# Patient Record
Sex: FEMALE | Race: BLACK OR AFRICAN AMERICAN | NOT HISPANIC OR LATINO
[De-identification: names, ages, dates, MRNs, and addresses within clinical notes are randomized per-mention and may not be internally consistent; named-entity substitution may affect disease eponyms.]

---

## 2017-01-18 ENCOUNTER — LABORATORY RESULT (OUTPATIENT)
Age: 37
End: 2017-01-18

## 2017-01-19 ENCOUNTER — OUTPATIENT (OUTPATIENT)
Dept: OUTPATIENT SERVICES | Facility: HOSPITAL | Age: 37
LOS: 1 days | End: 2017-01-19
Payer: MEDICAID

## 2017-01-19 ENCOUNTER — APPOINTMENT (OUTPATIENT)
Dept: INFECTIOUS DISEASE | Facility: CLINIC | Age: 37
End: 2017-01-19

## 2017-01-19 VITALS
BODY MASS INDEX: 32.49 KG/M2 | TEMPERATURE: 98.6 F | WEIGHT: 207 LBS | DIASTOLIC BLOOD PRESSURE: 73 MMHG | HEIGHT: 67 IN | OXYGEN SATURATION: 100 % | HEART RATE: 81 BPM | SYSTOLIC BLOOD PRESSURE: 108 MMHG

## 2017-01-19 DIAGNOSIS — B20 HUMAN IMMUNODEFICIENCY VIRUS [HIV] DISEASE: ICD-10-CM

## 2017-01-19 PROCEDURE — 86780 TREPONEMA PALLIDUM: CPT

## 2017-01-19 PROCEDURE — 87340 HEPATITIS B SURFACE AG IA: CPT

## 2017-01-19 PROCEDURE — 86708 HEPATITIS A ANTIBODY: CPT

## 2017-01-19 PROCEDURE — 36415 COLL VENOUS BLD VENIPUNCTURE: CPT

## 2017-01-19 PROCEDURE — G0463: CPT

## 2017-01-19 PROCEDURE — 85730 THROMBOPLASTIN TIME PARTIAL: CPT

## 2017-01-19 PROCEDURE — 83036 HEMOGLOBIN GLYCOSYLATED A1C: CPT

## 2017-01-19 PROCEDURE — 82306 VITAMIN D 25 HYDROXY: CPT

## 2017-01-19 PROCEDURE — 87536 HIV-1 QUANT&REVRSE TRNSCRPJ: CPT

## 2017-01-19 PROCEDURE — 86803 HEPATITIS C AB TEST: CPT

## 2017-01-19 PROCEDURE — 85610 PROTHROMBIN TIME: CPT

## 2017-01-19 PROCEDURE — 86709 HEPATITIS A IGM ANTIBODY: CPT

## 2017-01-19 PROCEDURE — 86706 HEP B SURFACE ANTIBODY: CPT

## 2017-01-19 PROCEDURE — 86480 TB TEST CELL IMMUN MEASURE: CPT

## 2017-01-19 PROCEDURE — 86360 T CELL ABSOLUTE COUNT/RATIO: CPT

## 2017-01-20 LAB
24R-OH-CALCIDIOL SERPL-MCNC: 22.8 NG/ML — LOW (ref 30–100)
4/8 RATIO: 1.77 RATIO — SIGNIFICANT CHANGE UP (ref 0.9–3.6)
ABS CD8: 390 /UL — SIGNIFICANT CHANGE UP (ref 136–757)
APTT BLD: 30.7 SEC — SIGNIFICANT CHANGE UP (ref 27.5–37.4)
CD3 BLASTS SPEC-ACNC: 1087 /UL — SIGNIFICANT CHANGE UP (ref 799–2171)
CD3 BLASTS SPEC-ACNC: 73 % — SIGNIFICANT CHANGE UP (ref 59–85)
CD4 %: 47 % — SIGNIFICANT CHANGE UP (ref 36–65)
CD8 %: 26 % — SIGNIFICANT CHANGE UP (ref 11–36)
HAV IGG+IGM SER QL: REACTIVE
HAV IGM SER-ACNC: SIGNIFICANT CHANGE UP
HBV SURFACE AB SER-ACNC: SIGNIFICANT CHANGE UP
HBV SURFACE AG SER-ACNC: SIGNIFICANT CHANGE UP
HCV AB S/CO SERPL IA: 0.09 S/CO — SIGNIFICANT CHANGE UP
HCV AB SERPL-IMP: SIGNIFICANT CHANGE UP
INR BLD: 1.07 RATIO — SIGNIFICANT CHANGE UP (ref 0.88–1.16)
PROTHROM AB SERPL-ACNC: 12.1 SEC — SIGNIFICANT CHANGE UP (ref 10–13.1)
T PALLIDUM AB TITR SER: NEGATIVE — SIGNIFICANT CHANGE UP
T-CELL CD4 SUBSET PNL BLD: 692 /UL — SIGNIFICANT CHANGE UP (ref 489–1457)

## 2017-01-21 LAB
HBA1C BLD-MCNC: 5.4 % — SIGNIFICANT CHANGE UP (ref 4–5.6)
HIV1 RNA # SERPL NAA+PROBE: SIGNIFICANT CHANGE UP
HIV1 RNA SERPL NAA+PROBE-LOG#: SIGNIFICANT CHANGE UP LG10COP/ML
M TB TUBERC IFN-G BLD QL: -0.01 IU/ML — SIGNIFICANT CHANGE UP
M TB TUBERC IFN-G BLD QL: 0.05 IU/ML — SIGNIFICANT CHANGE UP
M TB TUBERC IFN-G BLD QL: NEGATIVE — SIGNIFICANT CHANGE UP
MITOGEN IGNF BCKGRD COR BLD-ACNC: >10 IU/ML — SIGNIFICANT CHANGE UP

## 2017-02-10 ENCOUNTER — APPOINTMENT (OUTPATIENT)
Dept: INFECTIOUS DISEASE | Facility: CLINIC | Age: 37
End: 2017-02-10

## 2017-07-05 ENCOUNTER — LABORATORY RESULT (OUTPATIENT)
Age: 37
End: 2017-07-05

## 2017-07-06 ENCOUNTER — APPOINTMENT (OUTPATIENT)
Dept: INFECTIOUS DISEASE | Facility: CLINIC | Age: 37
End: 2017-07-06
Payer: MEDICAID

## 2017-07-06 ENCOUNTER — OUTPATIENT (OUTPATIENT)
Dept: OUTPATIENT SERVICES | Facility: HOSPITAL | Age: 37
LOS: 1 days | End: 2017-07-06
Payer: MEDICAID

## 2017-07-06 VITALS
OXYGEN SATURATION: 100 % | TEMPERATURE: 98.7 F | WEIGHT: 207 LBS | DIASTOLIC BLOOD PRESSURE: 82 MMHG | SYSTOLIC BLOOD PRESSURE: 126 MMHG | HEIGHT: 67 IN | HEART RATE: 70 BPM | RESPIRATION RATE: 16 BRPM | BODY MASS INDEX: 32.49 KG/M2

## 2017-07-06 DIAGNOSIS — B20 HUMAN IMMUNODEFICIENCY VIRUS [HIV] DISEASE: ICD-10-CM

## 2017-07-06 DIAGNOSIS — Z28.82 IMMUNIZATION NOT CARRIED OUT BECAUSE OF CAREGIVER REFUSAL: ICD-10-CM

## 2017-07-06 PROCEDURE — 83036 HEMOGLOBIN GLYCOSYLATED A1C: CPT

## 2017-07-06 PROCEDURE — G0463: CPT | Mod: 25

## 2017-07-06 PROCEDURE — 99214 OFFICE O/P EST MOD 30 MIN: CPT

## 2017-07-06 PROCEDURE — 86780 TREPONEMA PALLIDUM: CPT

## 2017-07-06 PROCEDURE — 87536 HIV-1 QUANT&REVRSE TRNSCRPJ: CPT

## 2017-07-06 PROCEDURE — 90834 PSYTX W PT 45 MINUTES: CPT

## 2017-07-06 PROCEDURE — 82306 VITAMIN D 25 HYDROXY: CPT

## 2017-07-06 PROCEDURE — 85027 COMPLETE CBC AUTOMATED: CPT

## 2017-07-06 PROCEDURE — 86706 HEP B SURFACE ANTIBODY: CPT

## 2017-07-06 PROCEDURE — 86480 TB TEST CELL IMMUN MEASURE: CPT

## 2017-07-06 PROCEDURE — 86709 HEPATITIS A IGM ANTIBODY: CPT

## 2017-07-06 PROCEDURE — 80061 LIPID PANEL: CPT

## 2017-07-06 PROCEDURE — 84443 ASSAY THYROID STIM HORMONE: CPT

## 2017-07-06 PROCEDURE — 86708 HEPATITIS A ANTIBODY: CPT

## 2017-07-06 PROCEDURE — 80053 COMPREHEN METABOLIC PANEL: CPT

## 2017-07-06 PROCEDURE — 86360 T CELL ABSOLUTE COUNT/RATIO: CPT

## 2017-07-06 PROCEDURE — 86803 HEPATITIS C AB TEST: CPT

## 2017-07-07 DIAGNOSIS — Z28.82 IMMUNIZATION NOT CARRIED OUT BECAUSE OF CAREGIVER REFUSAL: ICD-10-CM

## 2017-07-07 LAB
24R-OH-CALCIDIOL SERPL-MCNC: 25 NG/ML — LOW (ref 30–100)
4/8 RATIO: 1.79 RATIO — SIGNIFICANT CHANGE UP (ref 0.9–3.6)
ABS CD8: 546 /UL — SIGNIFICANT CHANGE UP (ref 136–757)
ALBUMIN SERPL ELPH-MCNC: 4 G/DL — SIGNIFICANT CHANGE UP (ref 3.3–5)
ALP SERPL-CCNC: 74 U/L — SIGNIFICANT CHANGE UP (ref 40–120)
ALT FLD-CCNC: 12 U/L — SIGNIFICANT CHANGE UP (ref 10–45)
ANION GAP SERPL CALC-SCNC: 16 MMOL/L — SIGNIFICANT CHANGE UP (ref 5–17)
AST SERPL-CCNC: 19 U/L — SIGNIFICANT CHANGE UP (ref 10–40)
BILIRUB SERPL-MCNC: 0.2 MG/DL — SIGNIFICANT CHANGE UP (ref 0.2–1.2)
BUN SERPL-MCNC: 13 MG/DL — SIGNIFICANT CHANGE UP (ref 7–23)
C TRACH RRNA SPEC QL NAA+PROBE: SIGNIFICANT CHANGE UP
CALCIUM SERPL-MCNC: 9.3 MG/DL — SIGNIFICANT CHANGE UP (ref 8.4–10.5)
CD3 BLASTS SPEC-ACNC: 1530 /UL — SIGNIFICANT CHANGE UP (ref 799–2171)
CD3 BLASTS SPEC-ACNC: 75 % — SIGNIFICANT CHANGE UP (ref 59–85)
CD4 %: 48 % — SIGNIFICANT CHANGE UP (ref 36–65)
CD8 %: 27 % — SIGNIFICANT CHANGE UP (ref 11–36)
CHLORIDE SERPL-SCNC: 108 MMOL/L — SIGNIFICANT CHANGE UP (ref 96–108)
CHOLEST SERPL-MCNC: 136 MG/DL — SIGNIFICANT CHANGE UP (ref 10–199)
CO2 SERPL-SCNC: 21 MMOL/L — LOW (ref 22–31)
CREAT SERPL-MCNC: 1.03 MG/DL — SIGNIFICANT CHANGE UP (ref 0.5–1.3)
GLUCOSE SERPL-MCNC: 82 MG/DL — SIGNIFICANT CHANGE UP (ref 70–99)
HAV IGG+IGM SER QL: REACTIVE
HAV IGM SER-ACNC: SIGNIFICANT CHANGE UP
HBA1C BLD-MCNC: 5.2 % — SIGNIFICANT CHANGE UP (ref 4–5.6)
HBV SURFACE AB SER-ACNC: SIGNIFICANT CHANGE UP
HCT VFR BLD CALC: 37.6 % — SIGNIFICANT CHANGE UP (ref 34.5–45)
HCV AB S/CO SERPL IA: 0.08 S/CO — SIGNIFICANT CHANGE UP
HCV AB SERPL-IMP: SIGNIFICANT CHANGE UP
HDLC SERPL-MCNC: 53 MG/DL — SIGNIFICANT CHANGE UP (ref 40–125)
HGB BLD-MCNC: 11.8 G/DL — SIGNIFICANT CHANGE UP (ref 11.5–15.5)
HIV-1 VIRAL LOAD RESULT: SIGNIFICANT CHANGE UP
HIV1 RNA # SERPL NAA+PROBE: SIGNIFICANT CHANGE UP
HIV1 RNA SER-IMP: SIGNIFICANT CHANGE UP
HIV1 RNA SERPL NAA+PROBE-ACNC: SIGNIFICANT CHANGE UP
HIV1 RNA SERPL NAA+PROBE-LOG#: SIGNIFICANT CHANGE UP LG COP/ML
LIPID PNL WITH DIRECT LDL SERPL: 77 MG/DL — SIGNIFICANT CHANGE UP
MCHC RBC-ENTMCNC: 27.3 PG — SIGNIFICANT CHANGE UP (ref 27–34)
MCHC RBC-ENTMCNC: 31.4 GM/DL — LOW (ref 32–36)
MCV RBC AUTO: 87 FL — SIGNIFICANT CHANGE UP (ref 80–100)
N GONORRHOEA RRNA SPEC QL NAA+PROBE: SIGNIFICANT CHANGE UP
PLATELET # BLD AUTO: 280 K/UL — SIGNIFICANT CHANGE UP (ref 150–400)
POTASSIUM SERPL-MCNC: 4.2 MMOL/L — SIGNIFICANT CHANGE UP (ref 3.5–5.3)
POTASSIUM SERPL-SCNC: 4.2 MMOL/L — SIGNIFICANT CHANGE UP (ref 3.5–5.3)
PROT SERPL-MCNC: 8.4 G/DL — HIGH (ref 6–8.3)
RBC # BLD: 4.32 M/UL — SIGNIFICANT CHANGE UP (ref 3.8–5.2)
RBC # FLD: 14.7 % — HIGH (ref 10.3–14.5)
SODIUM SERPL-SCNC: 145 MMOL/L — SIGNIFICANT CHANGE UP (ref 135–145)
SPECIMEN SOURCE: SIGNIFICANT CHANGE UP
T PALLIDUM AB TITR SER: NEGATIVE — SIGNIFICANT CHANGE UP
T-CELL CD4 SUBSET PNL BLD: 975 /UL — SIGNIFICANT CHANGE UP (ref 489–1457)
T4 FREE+ TSH PNL SERPL: 0.81 UIU/ML — SIGNIFICANT CHANGE UP (ref 0.27–4.2)
TOTAL CHOLESTEROL/HDL RATIO MEASUREMENT: 2.6 RATIO — LOW (ref 3.3–7.1)
TRIGL SERPL-MCNC: 29 MG/DL — SIGNIFICANT CHANGE UP (ref 10–149)
WBC # BLD: 5.24 K/UL — SIGNIFICANT CHANGE UP (ref 3.8–10.5)
WBC # FLD AUTO: 5.24 K/UL — SIGNIFICANT CHANGE UP (ref 3.8–10.5)

## 2017-07-08 LAB
M TB TUBERC IFN-G BLD QL: 0 IU/ML — SIGNIFICANT CHANGE UP
M TB TUBERC IFN-G BLD QL: 0.02 IU/ML — SIGNIFICANT CHANGE UP
M TB TUBERC IFN-G BLD QL: NEGATIVE — SIGNIFICANT CHANGE UP
MITOGEN IGNF BCKGRD COR BLD-ACNC: 0.96 IU/ML — SIGNIFICANT CHANGE UP

## 2017-08-11 ENCOUNTER — APPOINTMENT (OUTPATIENT)
Dept: INFECTIOUS DISEASE | Facility: CLINIC | Age: 37
End: 2017-08-11

## 2020-05-13 ENCOUNTER — FORM ENCOUNTER (OUTPATIENT)
Age: 40
End: 2020-05-13

## 2020-09-30 ENCOUNTER — LABORATORY RESULT (OUTPATIENT)
Age: 40
End: 2020-09-30

## 2020-09-30 ENCOUNTER — APPOINTMENT (OUTPATIENT)
Dept: INFECTIOUS DISEASE | Facility: CLINIC | Age: 40
End: 2020-09-30
Payer: COMMERCIAL

## 2020-09-30 VITALS
DIASTOLIC BLOOD PRESSURE: 81 MMHG | RESPIRATION RATE: 18 BRPM | BODY MASS INDEX: 32.26 KG/M2 | HEART RATE: 68 BPM | OXYGEN SATURATION: 99 % | SYSTOLIC BLOOD PRESSURE: 111 MMHG | TEMPERATURE: 99.3 F | WEIGHT: 206 LBS

## 2020-09-30 DIAGNOSIS — Z23 ENCOUNTER FOR IMMUNIZATION: ICD-10-CM

## 2020-09-30 PROCEDURE — 99214 OFFICE O/P EST MOD 30 MIN: CPT

## 2020-09-30 NOTE — PHYSICAL EXAM
[General Appearance - Alert] : alert [General Appearance - In No Acute Distress] : in no acute distress [Sclera] : the sclera and conjunctiva were normal [PERRL With Normal Accommodation] : pupils were equal in size, round, reactive to light [Extraocular Movements] : extraocular movements were intact [Outer Ear] : the ears and nose were normal in appearance [Oropharynx] : the oropharynx was normal with no thrush [Neck Cervical Mass (___cm)] : no neck mass was observed [Neck Appearance] : the appearance of the neck was normal [Jugular Venous Distention Increased] : there was no jugular-venous distention [Thyroid Diffuse Enlargement] : the thyroid was not enlarged [Auscultation Breath Sounds / Voice Sounds] : lungs were clear to auscultation bilaterally [Heart Sounds] : normal S1 and S2 [Heart Rate And Rhythm] : heart rate was normal and rhythm regular [Heart Sounds Gallop] : no gallops [Murmurs] : no murmurs [Full Pulse] : the pedal pulses are present [Heart Sounds Pericardial Friction Rub] : no pericardial rub [Bowel Sounds] : normal bowel sounds [Abdomen Soft] : soft [Edema] : there was no peripheral edema [Abdomen Tenderness] : non-tender [Abdomen Mass (___ Cm)] : no abdominal mass palpated [No Palpable Adenopathy] : no palpable adenopathy [Costovertebral Angle Tenderness] : no CVA tenderness [Musculoskeletal - Swelling] : no joint swelling [Motor Tone] : muscle strength and tone were normal [Nail Clubbing] : no clubbing  or cyanosis of the fingernails [Skin Color & Pigmentation] : normal skin color and pigmentation [Deep Tendon Reflexes (DTR)] : deep tendon reflexes were 2+ and symmetric [] : no rash [Sensation] : the sensory exam was normal to light touch and pinprick [No Focal Deficits] : no focal deficits [Affect] : the affect was normal [Oriented To Time, Place, And Person] : oriented to person, place, and time

## 2020-10-01 NOTE — ASSESSMENT
[Treatment Education] : treatment education [Treatment Adherence] : treatment adherence [Rx Dose / Side Effects] : Rx dose/side effects [Nutritional / Food Issues] : nutritional/food issues [Risk Reduction] : risk reduction [Universal Precautions] : universal precautions [Medical Care Issues] : medical care issues [Drug Interactions / Side Effects] : drug interactions/side effects [Disclosure of Diagnosis] : disclosure of diagnosis [Sexuality / Safer Sex] : sexuality/safer sex [HIV Education] : HIV Education [Education Materials Provided] : Eduction materials were provided [Anticipatory Guidance] : anticipatory guidance [FreeTextEntry1] : 39 yo woman with a history of longstanding HIV/AIDs diagnosed in 2009 but poorly adherent with her meds and off meds for about two years \par She developed zoster of the left upper back around to the left breast T5? dermatome\par \par \par HIV- virus will be wildtype at this point but will check genosure archive for underlying resistance and restart meds with Biktarvy and repeat labs in about a month to see if resuppresses\par Will ask research to see her to see if she is eligible for long acting trials as barrier is daily meds per pt.\par \par Zoster- sever case, mostly crusted at this point, new skin starting, topical bacitracin, cotninue valtrex until all lesions crusted,, pain meds prn\par \par HM- flu and pneumovax today\par \par RAP referral and social work support, offered pill trays but she is declining\par \par RTC in 1 mo

## 2020-10-01 NOTE — HISTORY OF PRESENT ILLNESS
[Men] : with men [FreeTextEntry1] : 41 yo woman with a history of AIDS, non adherent with meds for the past two years Returns to clinic today and is interested in restarting meds\par \par Scared because of a recent outbreak of VZV zoster on her left breast area around to her back area without crosses the ,midline. 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

## 2020-10-02 RX ORDER — BACITRACIN 500 [IU]/G
500 OINTMENT TOPICAL TWICE DAILY
Qty: 1 | Refills: 5 | Status: ACTIVE | COMMUNITY
Start: 2020-09-30 | End: 1900-01-01

## 2020-10-02 RX ORDER — VALACYCLOVIR 1 G/1
1 TABLET, FILM COATED ORAL
Qty: 20 | Refills: 0 | Status: ACTIVE | COMMUNITY
Start: 2020-09-30 | End: 1900-01-01

## 2020-11-03 ENCOUNTER — LABORATORY RESULT (OUTPATIENT)
Age: 40
End: 2020-11-03

## 2020-11-03 ENCOUNTER — NON-APPOINTMENT (OUTPATIENT)
Age: 40
End: 2020-11-03

## 2020-11-03 ENCOUNTER — FORM ENCOUNTER (OUTPATIENT)
Age: 40
End: 2020-11-03

## 2020-11-04 ENCOUNTER — APPOINTMENT (OUTPATIENT)
Dept: INFECTIOUS DISEASE | Facility: CLINIC | Age: 40
End: 2020-11-04
Payer: COMMERCIAL

## 2020-11-04 VITALS
WEIGHT: 205 LBS | OXYGEN SATURATION: 99 % | HEART RATE: 75 BPM | RESPIRATION RATE: 18 BRPM | DIASTOLIC BLOOD PRESSURE: 87 MMHG | TEMPERATURE: 98.6 F | HEIGHT: 68 IN | BODY MASS INDEX: 31.07 KG/M2 | SYSTOLIC BLOOD PRESSURE: 124 MMHG

## 2020-11-04 DIAGNOSIS — B02.9 ZOSTER W/OUT COMPLICATIONS: ICD-10-CM

## 2020-11-04 PROCEDURE — G0008: CPT

## 2020-11-04 PROCEDURE — 99072 ADDL SUPL MATRL&STAF TM PHE: CPT

## 2020-11-04 PROCEDURE — 99214 OFFICE O/P EST MOD 30 MIN: CPT | Mod: 25

## 2020-11-04 PROCEDURE — 90686 IIV4 VACC NO PRSV 0.5 ML IM: CPT

## 2020-11-04 NOTE — END OF VISIT
[Time Spent: ___ minutes] : I have spent [unfilled] minutes of time on the encounter. [>50% of the face to face encounter time was spent on counseling and/or coordination of care for ___] : Greater than 50% of the face to face encounter time was spent on counseling and/or coordination of care for [unfilled] [] : Fellow [FreeTextEntry3] : Patient seen and examined with the ID fellow\par I agree with his interval history exam and plans as noted above\par Please see my note for additonal informaiton

## 2020-11-04 NOTE — PHYSICAL EXAM
[General Appearance - Alert] : alert [General Appearance - In No Acute Distress] : in no acute distress [Sclera] : the sclera and conjunctiva were normal [PERRL With Normal Accommodation] : pupils were equal in size, round, reactive to light [Extraocular Movements] : extraocular movements were intact [Outer Ear] : the ears and nose were normal in appearance [Oropharynx] : the oropharynx was normal with no thrush [Neck Appearance] : the appearance of the neck was normal [Neck Cervical Mass (___cm)] : no neck mass was observed [Jugular Venous Distention Increased] : there was no jugular-venous distention [Thyroid Diffuse Enlargement] : the thyroid was not enlarged [Auscultation Breath Sounds / Voice Sounds] : lungs were clear to auscultation bilaterally [Heart Rate And Rhythm] : heart rate was normal and rhythm regular [Heart Sounds] : normal S1 and S2 [Heart Sounds Gallop] : no gallops [Murmurs] : no murmurs [Heart Sounds Pericardial Friction Rub] : no pericardial rub [Full Pulse] : the pedal pulses are present [Edema] : there was no peripheral edema [Bowel Sounds] : normal bowel sounds [Abdomen Soft] : soft [Abdomen Tenderness] : non-tender [Abdomen Mass (___ Cm)] : no abdominal mass palpated [Costovertebral Angle Tenderness] : no CVA tenderness [No Palpable Adenopathy] : no palpable adenopathy [Musculoskeletal - Swelling] : no joint swelling [Nail Clubbing] : no clubbing  or cyanosis of the fingernails [Motor Tone] : muscle strength and tone were normal [Skin Color & Pigmentation] : normal skin color and pigmentation [] : no rash [Deep Tendon Reflexes (DTR)] : deep tendon reflexes were 2+ and symmetric [Sensation] : the sensory exam was normal to light touch and pinprick [No Focal Deficits] : no focal deficits [Oriented To Time, Place, And Person] : oriented to person, place, and time [Affect] : the affect was normal [FreeTextEntry1] : + left sided crusted lesions in different stages of healing

## 2020-11-04 NOTE — ASSESSMENT
[Sexuality / Safer Sex] : sexuality/safer sex [Treatment Education] : treatment education [Treatment Adherence] : treatment adherence [Rx Dose / Side Effects] : Rx dose/side effects [Risk Reduction] : risk reduction [Nutritional / Food Issues] : nutritional/food issues [Universal Precautions] : universal precautions [Medical Care Issues] : medical care issues [Drug Interactions / Side Effects] : drug interactions/side effects [Disclosure of Diagnosis] : disclosure of diagnosis [HIV Education] : HIV Education [Anticipatory Guidance] : anticipatory guidance [Education Materials Provided] : Eduction materials were provided [FreeTextEntry1] : Assessment:\par 41 yo woman with a history of longstanding HIV/AIDs diagnosed in 2009 but poorly adherent with her meds and off meds for about two years \par She developed zoster of the left upper back around to the left breast T5? dermatome\par Started Valtrex\par \par Plan:\par #HIV disease\par - Needs repeat HIV labs to see if she re-suppressed on meds (T-cell subset and viral load)\par - CBC, CMP, and HIV genosure \par - Flu shot today, refused last office visit\par - RTC in 1 month \par

## 2020-11-04 NOTE — HISTORY OF PRESENT ILLNESS
[Men] : with men [FreeTextEntry1] : 39 yo woman with a history of AIDS, non adherent with meds for the past two years Returns to clinic today and is interested in restarting meds\par \par Scared because of a recent outbreak of VZV zoster on her left breast area around to her back area without crosses the ,midline. Painful but resolving crusted after about ten days\par \par Denies other intercurrent illnesses or hospitalization\par \par Working full time from home\par \par Restarted meds. 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

## 2020-11-04 NOTE — HISTORY OF PRESENT ILLNESS
[Men] : with men [FreeTextEntry1] : 39 yo woman with a history of AIDS, non adherent with meds for the past two years Returns to clinic today and is interested in restarting meds\par \par Scared because of a recent outbreak of VZV zoster on her left breast area around to her back area without crosses the ,midline. Painful but resolving crusted after about ten days\par \par Denies other intercurrent illnesses or hospitalization\par \par Working full time from home\par \par Restarted meds. 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

## 2020-11-04 NOTE — ASSESSMENT
[Sexuality / Safer Sex] : sexuality/safer sex [Treatment Education] : treatment education [Treatment Adherence] : treatment adherence [Rx Dose / Side Effects] : Rx dose/side effects [Risk Reduction] : risk reduction [Nutritional / Food Issues] : nutritional/food issues [Universal Precautions] : universal precautions [Medical Care Issues] : medical care issues [Drug Interactions / Side Effects] : drug interactions/side effects [Disclosure of Diagnosis] : disclosure of diagnosis [HIV Education] : HIV Education [Anticipatory Guidance] : anticipatory guidance [Education Materials Provided] : Eduction materials were provided [FreeTextEntry1] : Assessment:\par 39 yo woman with a history of longstanding HIV/AIDs diagnosed in 2009 but poorly adherent with her meds and off meds for about two years \par She developed zoster of the left upper back around to the left breast T5? dermatome\par Started Valtrex\par \par Plan:\par #HIV disease\par - Needs repeat HIV labs to see if she re-suppressed on meds (T-cell subset and viral load)\par - CBC, CMP, and HIV genosure \par - Flu shot today, refused last office visit\par - RTC in 1 month \par

## 2020-11-06 ENCOUNTER — NON-APPOINTMENT (OUTPATIENT)
Age: 40
End: 2020-11-06

## 2020-11-25 ENCOUNTER — APPOINTMENT (OUTPATIENT)
Dept: DERMATOLOGY | Facility: CLINIC | Age: 40
End: 2020-11-25
Payer: COMMERCIAL

## 2020-11-25 VITALS — HEIGHT: 68 IN | WEIGHT: 204 LBS | BODY MASS INDEX: 30.92 KG/M2

## 2020-11-25 DIAGNOSIS — L81.0 POSTINFLAMMATORY HYPERPIGMENTATION: ICD-10-CM

## 2020-11-25 DIAGNOSIS — Z20.6 CONTACT WITH AND (SUSPECTED) EXPOSURE TO HUMAN IMMUNODEFICIENCY VIRUS [HIV]: ICD-10-CM

## 2020-11-25 DIAGNOSIS — Z78.9 OTHER SPECIFIED HEALTH STATUS: ICD-10-CM

## 2020-11-25 PROCEDURE — 99203 OFFICE O/P NEW LOW 30 MIN: CPT

## 2020-11-26 ENCOUNTER — RESULT REVIEW (OUTPATIENT)
Age: 40
End: 2020-11-26

## 2020-11-27 ENCOUNTER — APPOINTMENT (OUTPATIENT)
Dept: INFECTIOUS DISEASE | Facility: CLINIC | Age: 40
End: 2020-11-27

## 2020-11-27 DIAGNOSIS — N60.11 DIFFUSE CYSTIC MASTOPATHY OF RIGHT BREAST: ICD-10-CM

## 2020-11-27 DIAGNOSIS — N60.12 DIFFUSE CYSTIC MASTOPATHY OF RIGHT BREAST: ICD-10-CM

## 2021-01-07 ENCOUNTER — NON-APPOINTMENT (OUTPATIENT)
Age: 41
End: 2021-01-07

## 2021-02-01 ENCOUNTER — APPOINTMENT (OUTPATIENT)
Dept: INFECTIOUS DISEASE | Facility: CLINIC | Age: 41
End: 2021-02-01

## 2021-02-25 ENCOUNTER — NON-APPOINTMENT (OUTPATIENT)
Age: 41
End: 2021-02-25

## 2021-02-25 ENCOUNTER — APPOINTMENT (OUTPATIENT)
Dept: INFECTIOUS DISEASE | Facility: CLINIC | Age: 41
End: 2021-02-25
Payer: COMMERCIAL

## 2021-02-25 VITALS
SYSTOLIC BLOOD PRESSURE: 120 MMHG | OXYGEN SATURATION: 97 % | HEIGHT: 68 IN | TEMPERATURE: 98.7 F | HEART RATE: 63 BPM | DIASTOLIC BLOOD PRESSURE: 83 MMHG

## 2021-02-25 PROCEDURE — 99072 ADDL SUPL MATRL&STAF TM PHE: CPT

## 2021-02-25 PROCEDURE — 99213 OFFICE O/P EST LOW 20 MIN: CPT

## 2021-02-26 LAB
ALBUMIN SERPL ELPH-MCNC: 4 G/DL
ALP BLD-CCNC: 91 U/L
ALT SERPL-CCNC: 11 U/L
ANION GAP SERPL CALC-SCNC: 13 MMOL/L
AST SERPL-CCNC: 20 U/L
BASOPHILS # BLD AUTO: 0.04 K/UL
BASOPHILS NFR BLD AUTO: 0.9 %
BILIRUB SERPL-MCNC: 0.3 MG/DL
BUN SERPL-MCNC: 8 MG/DL
CALCIUM SERPL-MCNC: 9.7 MG/DL
CHLORIDE SERPL-SCNC: 102 MMOL/L
CHOLEST SERPL-MCNC: 150 MG/DL
CO2 SERPL-SCNC: 22 MMOL/L
CREAT SERPL-MCNC: 1.17 MG/DL
EOSINOPHIL # BLD AUTO: 0.07 K/UL
EOSINOPHIL NFR BLD AUTO: 1.6 %
GLUCOSE SERPL-MCNC: 86 MG/DL
HCT VFR BLD CALC: 37.3 %
HDLC SERPL-MCNC: 54 MG/DL
HGB BLD-MCNC: 11.5 G/DL
IMM GRANULOCYTES NFR BLD AUTO: 0.2 %
LDLC SERPL CALC-MCNC: 89 MG/DL
LYMPHOCYTES # BLD AUTO: 1.58 K/UL
LYMPHOCYTES NFR BLD AUTO: 36.8 %
MAN DIFF?: NORMAL
MCHC RBC-ENTMCNC: 29.1 PG
MCHC RBC-ENTMCNC: 30.8 GM/DL
MCV RBC AUTO: 94.4 FL
MONOCYTES # BLD AUTO: 0.32 K/UL
MONOCYTES NFR BLD AUTO: 7.5 %
NEUTROPHILS # BLD AUTO: 2.27 K/UL
NEUTROPHILS NFR BLD AUTO: 53 %
NONHDLC SERPL-MCNC: 97 MG/DL
PLATELET # BLD AUTO: 286 K/UL
POTASSIUM SERPL-SCNC: 4 MMOL/L
PROT SERPL-MCNC: 8.5 G/DL
RBC # BLD: 3.95 M/UL
RBC # FLD: 13 %
SODIUM SERPL-SCNC: 137 MMOL/L
T PALLIDUM AB SER QL IA: NEGATIVE
TRIGL SERPL-MCNC: 38 MG/DL
WBC # FLD AUTO: 4.29 K/UL

## 2021-02-27 LAB
CD3 CELLS # BLD: 1111 /UL
CD3 CELLS NFR BLD: 78 %
CD3+CD4+ CELLS # BLD: 574 /UL
CD3+CD4+ CELLS NFR BLD: 40 %
CD3+CD4+ CELLS/CD3+CD8+ CLL SPEC: 1.09 RATIO
CD3+CD8+ CELLS # SPEC: 525 /UL
CD3+CD8+ CELLS NFR BLD: 37 %
HIV1 RNA # SERPL NAA+PROBE: NORMAL
HIV1 RNA # SERPL NAA+PROBE: NORMAL COPIES/ML
SARS-COV-2 IGG SERPL IA-ACNC: <0.1 INDEX
SARS-COV-2 IGG SERPL QL IA: NEGATIVE
VIRAL LOAD INTERP: NORMAL
VIRAL LOAD LOG: NORMAL LG COP/ML

## 2021-02-27 NOTE — ASSESSMENT
[FreeTextEntry1] : 39 yo woman with a history of longstanding HIV/AIDs diagnosed in 2009 but poorly adherent with her meds and off meds for about two years \par She developed zoster of the left upper back around to the left breast T5? dermatome\par Treated with Valtrex and reinstitution of ARVs with healing but with visible scarring across her left breeast around the back \par \par \par HIV- repeat HIV labs  to confirm adherence and suppression of viral load, \par \par CBC and CMP to check for side effects\par \par STI screen- urine GC/chlam and RPR, she denies any recent sexual contacts\par \par COVID avoidance counseling- continues to work remotely for [employer] services, no intercurrent COVID like illness\par check antibodies\par Vaccine counseling provided, refusing to receive until at least a year has passed\par risks/benefits discussed as well as interaction with HIV\par \par RTC in 4mo [Treatment Education] : treatment education [Treatment Adherence] : treatment adherence [Rx Dose / Side Effects] : Rx dose/side effects [Risk Reduction] : risk reduction [Nutritional / Food Issues] : nutritional/food issues [Universal Precautions] : universal precautions [Medical Care Issues] : medical care issues [Drug Interactions / Side Effects] : drug interactions/side effects [Disclosure of Diagnosis] : disclosure of diagnosis [HIV Education] : HIV Education [Anticipatory Guidance] : anticipatory guidance [Education Materials Provided] : Eduction materials were provided

## 2021-02-27 NOTE — HISTORY OF PRESENT ILLNESS
[Men] : with men [FreeTextEntry1] : 39 yo woman with a history of AIDS, non adherent with meds for the past two years Returns to clinic today and is interested in restarting meds\par \par Scared because of a recent outbreak of VZV zoster on her left breast area around to her back area without crosses the ,midline. 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

## 2021-05-21 ENCOUNTER — NON-APPOINTMENT (OUTPATIENT)
Age: 41
End: 2021-05-21

## 2021-09-02 ENCOUNTER — RX RENEWAL (OUTPATIENT)
Age: 41
End: 2021-09-02

## 2021-09-10 ENCOUNTER — NON-APPOINTMENT (OUTPATIENT)
Age: 41
End: 2021-09-10

## 2021-09-22 ENCOUNTER — NON-APPOINTMENT (OUTPATIENT)
Age: 41
End: 2021-09-22

## 2021-10-12 ENCOUNTER — NON-APPOINTMENT (OUTPATIENT)
Age: 41
End: 2021-10-12

## 2021-10-27 ENCOUNTER — APPOINTMENT (OUTPATIENT)
Dept: INFECTIOUS DISEASE | Facility: CLINIC | Age: 41
End: 2021-10-27

## 2021-10-27 ENCOUNTER — NON-APPOINTMENT (OUTPATIENT)
Age: 41
End: 2021-10-27

## 2021-11-01 ENCOUNTER — NON-APPOINTMENT (OUTPATIENT)
Age: 41
End: 2021-11-01

## 2021-11-24 ENCOUNTER — NON-APPOINTMENT (OUTPATIENT)
Age: 41
End: 2021-11-24

## 2021-11-28 ENCOUNTER — FORM ENCOUNTER (OUTPATIENT)
Age: 41
End: 2021-11-28

## 2021-11-29 ENCOUNTER — APPOINTMENT (OUTPATIENT)
Dept: INFECTIOUS DISEASE | Facility: CLINIC | Age: 41
End: 2021-11-29
Payer: COMMERCIAL

## 2021-11-29 VITALS
DIASTOLIC BLOOD PRESSURE: 74 MMHG | SYSTOLIC BLOOD PRESSURE: 107 MMHG | HEIGHT: 68 IN | BODY MASS INDEX: 33.04 KG/M2 | OXYGEN SATURATION: 98 % | TEMPERATURE: 98.7 F | HEART RATE: 70 BPM | WEIGHT: 218 LBS

## 2021-11-29 PROCEDURE — 99214 OFFICE O/P EST MOD 30 MIN: CPT

## 2021-11-29 PROCEDURE — 90834 PSYTX W PT 45 MINUTES: CPT

## 2021-11-29 NOTE — HISTORY OF PRESENT ILLNESS
[FreeTextEntry1] : 40 yo female here for HIV f/u consult \par taking Biktarvy daily with no SE or missed doses since last consult. \par she has no lingering pain from Shingles outbreak \par Mild lingering discoloration skin noted. \par denies any c/o at this time \par \par She is planning to move to [place] soon. \par She would like to continue care here. \par \par \par From last consult 2/25/2021 : \par 39 yo woman with a history of AIDS, non adherent with meds for the past two years Returns to clinic today and is interested in restarting meds\par \par Scared because of a recent outbreak of VZV zoster on her left breast area around to her back area without crosses the ,midline. 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 and reports ongoing adherence with medications for the past three months\par \par Planing on moving her elderly mother to [place] and considering working remotely from [place]- discussed asking social work to confirm insurance portability\par \par \par \par Sexual History: The patient is not sexually active. The patient has intercourse with men. 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 \par Lives with sister. \par  \par Active Problems\par Anemia (285.9) (D64.9)\par Bilateral fibrocystic breast changes (610.1) (N60.11,N60.12)\par Chronic back pain (724.5,338.29) (M54.9,G89.29)\par Encounter for immunization (V03.89) (Z23)\par H/O CD4 count (V15.89) (Z92.89)\par HIV disease (042) (B20)\par Increased BMI (783.9) (R63.8)\par Postinflammatory hyperpigmentation (709.00) (L81.0)\par Refused influenza vaccine (V64.06) (Z28.21)\par Right knee pain (719.46) (M25.561)\par Shingles rash (053.9) (B02.9)\par Vaccine refused by parent (V64.05) (Z28.82)\par Vitamin D deficiency (268.9) (E55.9)\par \par Past Medical History\par History of Complete eye exam, encounter for (V72.0) (Z01.00)\par History of Encounter for dental examination (V72.2) (Z01.20)\par History of exposure to HIV (V01.79) (Z20.6)\par History of Knee fracture, right (827.0)\par History of Slipped intervertebral disc (722.2)\par History of Torn meniscus (836.2) (S83.209A)\par History of Visit for gynecologic examination (V72.31) (Z01.419)\par \par Current Meds\par Bacitracin 500 UNIT/GM External Ointment; APPLY SPARINGLY TO AFFECTED AREA(S)\par TWICE DAILY\par Biktarvy -25 MG Oral Tablet; one pill once a day\par Iron 325 (65 Fe) MG Oral Tablet; TAKE 1 TABLET DAILY WITH FOOD\par Multi-Vitamin Oral Tablet; TAKE 1 TABLET DAILY\par valACYclovir HCl - 1 GM Oral Tablet; take 1 tab PO BID until symptoms resolve\par Vitamin D3 125 MCG (5000 UT) Oral Capsule; TAKE 1 CAPSULE Daily\par \par Allergies\par No Known Drug Allergies\par \par 11/29/2021  Plan : \par 1. Continue biktarvy daily - refills given \par 2. bloodwork ordered \par 3. f/u 6 months\par

## 2021-11-29 NOTE — REVIEW OF SYSTEMS
[Negative] : Heme/Lymph [Skin Lesions] : no skin lesions [Skin Wound] : no skin wound [Itching] : no itching [de-identified] : mild lingering skin discoloration from shingles outbreak

## 2021-11-29 NOTE — PHYSICAL EXAM
[General Appearance - Alert] : alert [General Appearance - In No Acute Distress] : in no acute distress [General Appearance - Well Nourished] : well nourished [General Appearance - Well-Appearing] : healthy appearing [Sclera] : the sclera and conjunctiva were normal [PERRL With Normal Accommodation] : pupils were equal in size, round, reactive to light [Extraocular Movements] : extraocular movements were intact [Outer Ear] : the ears and nose were normal in appearance [Hearing Threshold Finger Rub Not [place]] : hearing was normal [Examination Of The Oral Cavity] : the lips and gums were normal [Both Tympanic Membranes Were Examined] : both tympanic membranes were normal [Oropharynx] : the oropharynx was normal with no thrush [Neck Appearance] : the appearance of the neck was normal [Neck Cervical Mass (___cm)] : no neck mass was observed [Jugular Venous Distention Increased] : there was no jugular-venous distention [Thyroid Diffuse Enlargement] : the thyroid was not enlarged [Respiration, Rhythm And Depth] : normal respiratory rhythm and effort [Exaggerated Use Of Accessory Muscles For Inspiration] : no accessory muscle use [Auscultation Breath Sounds / Voice Sounds] : lungs were clear to auscultation bilaterally [Heart Rate And Rhythm] : heart rate was normal and rhythm regular [Heart Sounds] : normal S1 and S2 [Heart Sounds Gallop] : no gallops [Murmurs] : no murmurs [Heart Sounds Pericardial Friction Rub] : no pericardial rub [Edema] : there was no peripheral edema [Bowel Sounds] : normal bowel sounds [Abdomen Soft] : soft [Abdomen Tenderness] : non-tender [Costovertebral Angle Tenderness] : no CVA tenderness [No Palpable Adenopathy] : no palpable adenopathy [Cervical Lymph Nodes Enlarged Posterior Bilaterally] : posterior cervical [Cervical Lymph Nodes Enlarged Anterior Bilaterally] : anterior cervical [Supraclavicular Lymph Nodes Enlarged Bilaterally] : supraclavicular [Range of Motion to Joints] : range of motion to joints [Motor Tone] : muscle strength and tone were normal [Skin Color & Pigmentation] : normal skin color and pigmentation [] : no rash [Skin Lesions] : no skin lesions [FreeTextEntry1] : mild skin discoloration from shingles outbreak [Cranial Nerves] : cranial nerves 2-12 were intact [Motor Exam] : the motor exam was normal [Oriented To Time, Place, And Person] : oriented to person, place, and time [Affect] : the affect was normal

## 2021-11-30 LAB
25(OH)D3 SERPL-MCNC: 31.2 NG/ML
ALBUMIN SERPL ELPH-MCNC: 4 G/DL
ALP BLD-CCNC: 67 U/L
ALT SERPL-CCNC: 14 U/L
ANION GAP SERPL CALC-SCNC: 10 MMOL/L
AST SERPL-CCNC: 20 U/L
BASOPHILS # BLD AUTO: 0.05 K/UL
BASOPHILS NFR BLD AUTO: 1.1 %
BILIRUB SERPL-MCNC: 0.2 MG/DL
BUN SERPL-MCNC: 14 MG/DL
CALCIUM SERPL-MCNC: 9.5 MG/DL
CD3 CELLS # BLD: 1036 /UL
CD3 CELLS NFR BLD: 74 %
CD3+CD4+ CELLS # BLD: 606 /UL
CD3+CD4+ CELLS NFR BLD: 44 %
CD3+CD4+ CELLS/CD3+CD8+ CLL SPEC: 1.45 RATIO
CD3+CD8+ CELLS # SPEC: 419 /UL
CD3+CD8+ CELLS NFR BLD: 30 %
CHLORIDE SERPL-SCNC: 106 MMOL/L
CHOLEST SERPL-MCNC: 143 MG/DL
CO2 SERPL-SCNC: 22 MMOL/L
CREAT SERPL-MCNC: 1.11 MG/DL
EOSINOPHIL # BLD AUTO: 0.29 K/UL
EOSINOPHIL NFR BLD AUTO: 6.1 %
ESTIMATED AVERAGE GLUCOSE: 103 MG/DL
GLUCOSE SERPL-MCNC: 89 MG/DL
HBA1C MFR BLD HPLC: 5.2 %
HCT VFR BLD CALC: 37.7 %
HCV AB SER QL: NONREACTIVE
HCV S/CO RATIO: 0.3 S/CO
HDLC SERPL-MCNC: 51 MG/DL
HGB BLD-MCNC: 11.9 G/DL
HIV1 RNA # SERPL NAA+PROBE: NORMAL
HIV1 RNA # SERPL NAA+PROBE: NORMAL COPIES/ML
IMM GRANULOCYTES NFR BLD AUTO: 0.6 %
LDLC SERPL CALC-MCNC: 86 MG/DL
LYMPHOCYTES # BLD AUTO: 1.66 K/UL
LYMPHOCYTES NFR BLD AUTO: 34.9 %
MAN DIFF?: NORMAL
MCHC RBC-ENTMCNC: 29.1 PG
MCHC RBC-ENTMCNC: 31.6 GM/DL
MCV RBC AUTO: 92.2 FL
MONOCYTES # BLD AUTO: 0.33 K/UL
MONOCYTES NFR BLD AUTO: 6.9 %
NEUTROPHILS # BLD AUTO: 2.4 K/UL
NEUTROPHILS NFR BLD AUTO: 50.4 %
NONHDLC SERPL-MCNC: 92 MG/DL
PLATELET # BLD AUTO: 249 K/UL
POTASSIUM SERPL-SCNC: 4.8 MMOL/L
PROT SERPL-MCNC: 7.4 G/DL
RBC # BLD: 4.09 M/UL
RBC # FLD: 13.5 %
SODIUM SERPL-SCNC: 138 MMOL/L
T PALLIDUM AB SER QL IA: NEGATIVE
TRIGL SERPL-MCNC: 32 MG/DL
VIRAL LOAD INTERP: NORMAL
VIRAL LOAD LOG: NORMAL LG COP/ML
WBC # FLD AUTO: 4.76 K/UL

## 2021-12-01 LAB
C TRACH RRNA SPEC QL NAA+PROBE: NOT DETECTED
M TB IFN-G BLD-IMP: NEGATIVE
N GONORRHOEA RRNA SPEC QL NAA+PROBE: NOT DETECTED
QUANTIFERON TB PLUS MITOGEN MINUS NIL: 2.14 IU/ML
QUANTIFERON TB PLUS NIL: 0.02 IU/ML
QUANTIFERON TB PLUS TB1 MINUS NIL: -0.01 IU/ML
QUANTIFERON TB PLUS TB2 MINUS NIL: -0.01 IU/ML
SOURCE ORAL: NORMAL

## 2022-05-10 ENCOUNTER — APPOINTMENT (OUTPATIENT)
Dept: INFECTIOUS DISEASE | Facility: CLINIC | Age: 42
End: 2022-05-10

## 2022-05-11 ENCOUNTER — NON-APPOINTMENT (OUTPATIENT)
Age: 42
End: 2022-05-11

## 2022-05-13 ENCOUNTER — NON-APPOINTMENT (OUTPATIENT)
Age: 42
End: 2022-05-13

## 2022-05-16 ENCOUNTER — NON-APPOINTMENT (OUTPATIENT)
Age: 42
End: 2022-05-16

## 2022-05-17 ENCOUNTER — NON-APPOINTMENT (OUTPATIENT)
Age: 42
End: 2022-05-17

## 2022-06-20 ENCOUNTER — NON-APPOINTMENT (OUTPATIENT)
Age: 42
End: 2022-06-20

## 2022-08-27 ENCOUNTER — RX RENEWAL (OUTPATIENT)
Age: 42
End: 2022-08-27

## 2022-08-30 ENCOUNTER — NON-APPOINTMENT (OUTPATIENT)
Age: 42
End: 2022-08-30

## 2022-09-01 ENCOUNTER — NON-APPOINTMENT (OUTPATIENT)
Age: 42
End: 2022-09-01

## 2022-09-06 ENCOUNTER — NON-APPOINTMENT (OUTPATIENT)
Age: 42
End: 2022-09-06

## 2022-09-16 ENCOUNTER — NON-APPOINTMENT (OUTPATIENT)
Age: 42
End: 2022-09-16

## 2022-09-19 ENCOUNTER — APPOINTMENT (OUTPATIENT)
Dept: INFECTIOUS DISEASE | Facility: CLINIC | Age: 42
End: 2022-09-19

## 2022-09-28 ENCOUNTER — NON-APPOINTMENT (OUTPATIENT)
Age: 42
End: 2022-09-28

## 2022-09-30 ENCOUNTER — NON-APPOINTMENT (OUTPATIENT)
Age: 42
End: 2022-09-30

## 2022-10-25 ENCOUNTER — NON-APPOINTMENT (OUTPATIENT)
Age: 42
End: 2022-10-25

## 2023-01-03 ENCOUNTER — NON-APPOINTMENT (OUTPATIENT)
Age: 43
End: 2023-01-03

## 2023-01-12 ENCOUNTER — NON-APPOINTMENT (OUTPATIENT)
Age: 43
End: 2023-01-12

## 2023-08-09 ENCOUNTER — RX RENEWAL (OUTPATIENT)
Age: 43
End: 2023-08-09

## 2023-08-15 ENCOUNTER — LABORATORY RESULT (OUTPATIENT)
Age: 43
End: 2023-08-15

## 2023-08-15 ENCOUNTER — APPOINTMENT (OUTPATIENT)
Dept: INFECTIOUS DISEASE | Facility: CLINIC | Age: 43
End: 2023-08-15
Payer: COMMERCIAL

## 2023-08-15 VITALS
OXYGEN SATURATION: 99 % | DIASTOLIC BLOOD PRESSURE: 76 MMHG | BODY MASS INDEX: 31.07 KG/M2 | HEIGHT: 68 IN | WEIGHT: 205 LBS | RESPIRATION RATE: 18 BRPM | HEART RATE: 62 BPM | TEMPERATURE: 99 F | SYSTOLIC BLOOD PRESSURE: 109 MMHG

## 2023-08-15 DIAGNOSIS — Z01.20 ENCOUNTER FOR DENTAL EXAMINATION AND CLEANING W/OUT ABNORMAL FINDINGS: ICD-10-CM

## 2023-08-15 DIAGNOSIS — Z78.9 OTHER SPECIFIED HEALTH STATUS: ICD-10-CM

## 2023-08-15 DIAGNOSIS — B20 HUMAN IMMUNODEFICIENCY VIRUS [HIV] DISEASE: ICD-10-CM

## 2023-08-15 DIAGNOSIS — Z01.419 ENCOUNTER FOR GYNECOLOGICAL EXAMINATION (GENERAL) (ROUTINE) W/OUT ABNORMAL FINDINGS: ICD-10-CM

## 2023-08-15 DIAGNOSIS — Z01.00 ENCOUNTER FOR EXAMINATION OF EYES AND VISION W/OUT ABNORMAL FINDINGS: ICD-10-CM

## 2023-08-15 PROCEDURE — 99213 OFFICE O/P EST LOW 20 MIN: CPT

## 2023-08-15 NOTE — PHYSICAL EXAM
[General Appearance - Alert] : alert [General Appearance - In No Acute Distress] : in no acute distress [Extraocular Movements] : extraocular movements were intact [Neck Appearance] : the appearance of the neck was normal [Auscultation Breath Sounds / Voice Sounds] : lungs were clear to auscultation bilaterally [Heart Sounds] : normal S1 and S2 [Edema] : there was no peripheral edema [Bowel Sounds] : normal bowel sounds [Abdomen Soft] : soft [Musculoskeletal - Swelling] : no joint swelling [] : no rash [No Focal Deficits] : no focal deficits [Oriented To Time, Place, And Person] : oriented to person, place, and time

## 2023-08-15 NOTE — END OF VISIT
[] : Fellow [FreeTextEntry3] : HIV on Biktarvy, doing well, continue current meds. Has been in [place], working remotely. Feels well except for eczematous rash that she associates with hot/humid climate. Using topical steroid and antihistamine. Check annual labs, defers all vaccinations, hep A immune, will try to set up GYN (Dr TOLEDO or Ms Garay) and mammogram while she is in [place] this week.

## 2023-08-15 NOTE — ASSESSMENT
[FreeTextEntry1] : 43 F PMH HIV on Biktarvy, iron deficiency, vitamin D deficiency who presents for follow up.   #HIV Continue Biktarvy.  Will check lab work.   #HCM Needs GYN follow up, pt states she will follow up. Refusing all vaccinations.

## 2023-08-15 NOTE — HISTORY OF PRESENT ILLNESS
[FreeTextEntry1] : 43 F PMH HIV on Biktarvy, iron deficiency, vitamin D deficiency who presents for follow up.   Last seen Nov 2021  Biktarvy doses missed in last week: None, has been compliant.  Acute complaints: None  GYN follow up? Last seen in 2021. Will follow up.  Refusing vaccinations

## 2023-08-23 ENCOUNTER — NON-APPOINTMENT (OUTPATIENT)
Age: 43
End: 2023-08-23

## 2024-02-12 ENCOUNTER — NON-APPOINTMENT (OUTPATIENT)
Age: 44
End: 2024-02-12

## 2024-02-13 ENCOUNTER — APPOINTMENT (OUTPATIENT)
Dept: INFECTIOUS DISEASE | Facility: CLINIC | Age: 44
End: 2024-02-13

## 2024-02-15 ENCOUNTER — APPOINTMENT (OUTPATIENT)
Dept: INFECTIOUS DISEASE | Facility: CLINIC | Age: 44
End: 2024-02-15

## 2024-03-05 ENCOUNTER — APPOINTMENT (OUTPATIENT)
Dept: INFECTIOUS DISEASE | Facility: CLINIC | Age: 44
End: 2024-03-05

## 2024-03-06 ENCOUNTER — NON-APPOINTMENT (OUTPATIENT)
Age: 44
End: 2024-03-06

## 2024-03-07 ENCOUNTER — NON-APPOINTMENT (OUTPATIENT)
Age: 44
End: 2024-03-07

## 2024-03-11 ENCOUNTER — NON-APPOINTMENT (OUTPATIENT)
Age: 44
End: 2024-03-11

## 2024-04-25 ENCOUNTER — RX RENEWAL (OUTPATIENT)
Age: 44
End: 2024-04-25

## 2024-04-25 RX ORDER — BICTEGRAVIR SODIUM, EMTRICITABINE, AND TENOFOVIR ALAFENAMIDE FUMARATE 50; 200; 25 MG/1; MG/1; MG/1
50-200-25 TABLET ORAL
Qty: 90 | Refills: 3 | Status: ACTIVE | COMMUNITY
Start: 2020-09-30 | End: 1900-01-01

## 2024-08-28 ENCOUNTER — NON-APPOINTMENT (OUTPATIENT)
Age: 44
End: 2024-08-28

## 2024-08-29 ENCOUNTER — LABORATORY RESULT (OUTPATIENT)
Age: 44
End: 2024-08-29

## 2024-08-29 ENCOUNTER — APPOINTMENT (OUTPATIENT)
Dept: INFECTIOUS DISEASE | Facility: CLINIC | Age: 44
End: 2024-08-29
Payer: COMMERCIAL

## 2024-08-29 ENCOUNTER — NON-APPOINTMENT (OUTPATIENT)
Age: 44
End: 2024-08-29

## 2024-08-29 VITALS
HEIGHT: 68 IN | DIASTOLIC BLOOD PRESSURE: 80 MMHG | TEMPERATURE: 98.3 F | HEART RATE: 78 BPM | WEIGHT: 208 LBS | BODY MASS INDEX: 31.52 KG/M2 | SYSTOLIC BLOOD PRESSURE: 135 MMHG | OXYGEN SATURATION: 98 %

## 2024-08-29 DIAGNOSIS — Z01.419 ENCOUNTER FOR GYNECOLOGICAL EXAMINATION (GENERAL) (ROUTINE) W/OUT ABNORMAL FINDINGS: ICD-10-CM

## 2024-08-29 DIAGNOSIS — Z01.20 ENCOUNTER FOR DENTAL EXAMINATION AND CLEANING W/OUT ABNORMAL FINDINGS: ICD-10-CM

## 2024-08-29 DIAGNOSIS — B20 HUMAN IMMUNODEFICIENCY VIRUS [HIV] DISEASE: ICD-10-CM

## 2024-08-29 DIAGNOSIS — Z01.00 ENCOUNTER FOR EXAMINATION OF EYES AND VISION W/OUT ABNORMAL FINDINGS: ICD-10-CM

## 2024-08-29 PROCEDURE — 99214 OFFICE O/P EST MOD 30 MIN: CPT

## 2024-08-30 ENCOUNTER — LABORATORY RESULT (OUTPATIENT)
Age: 44
End: 2024-08-30

## 2024-08-30 ENCOUNTER — APPOINTMENT (OUTPATIENT)
Dept: INFECTIOUS DISEASE | Facility: CLINIC | Age: 44
End: 2024-08-30

## 2024-08-30 DIAGNOSIS — E55.9 VITAMIN D DEFICIENCY, UNSPECIFIED: ICD-10-CM

## 2024-08-30 LAB
25(OH)D3 SERPL-MCNC: 22.3 NG/ML
ALBUMIN SERPL ELPH-MCNC: 4.2 G/DL
ALP BLD-CCNC: 80 U/L
ALT SERPL-CCNC: 12 U/L
ANION GAP SERPL CALC-SCNC: 10 MMOL/L
APPEARANCE: CLEAR
AST SERPL-CCNC: 18 U/L
BILIRUB SERPL-MCNC: 0.3 MG/DL
BILIRUBIN URINE: NEGATIVE
BLOOD URINE: ABNORMAL
BUN SERPL-MCNC: 11 MG/DL
C TRACH RRNA SPEC QL NAA+PROBE: NOT DETECTED
CALCIUM SERPL-MCNC: 9.2 MG/DL
CD3 CELLS # BLD: 835 CELLS/UL
CD3 CELLS NFR BLD: 76 %
CD3+CD4+ CELLS # BLD: 550 CELLS/UL
CD3+CD4+ CELLS NFR BLD: 50 %
CD3+CD4+ CELLS/CD3+CD8+ CLL SPEC: 2 RATIO
CD3+CD8+ CELLS # SPEC: 275 CELLS/UL
CD3+CD8+ CELLS NFR BLD: 25 %
CHLORIDE SERPL-SCNC: 106 MMOL/L
CHOLEST SERPL-MCNC: 155 MG/DL
CO2 SERPL-SCNC: 25 MMOL/L
COLOR: YELLOW
CREAT SERPL-MCNC: 1.08 MG/DL
EGFR: 66 ML/MIN/1.73M2
ESTIMATED AVERAGE GLUCOSE: 108 MG/DL
GLUCOSE QUALITATIVE U: NEGATIVE MG/DL
GLUCOSE SERPL-MCNC: 88 MG/DL
HBA1C MFR BLD HPLC: 5.4 %
HCT VFR BLD CALC: 39.1 %
HCV AB SER QL: NONREACTIVE
HCV S/CO RATIO: 0.6 S/CO
HDLC SERPL-MCNC: 56 MG/DL
HEPB DNA PCR INT: NOT DETECTED
HEPB DNA PCR LOG: NOT DETECTED LOGIU/ML
HGB BLD-MCNC: 12.3 G/DL
HIV1 RNA # SERPL NAA+PROBE: NORMAL
HIV1 RNA # SERPL NAA+PROBE: NORMAL COPIES/ML
KETONES URINE: ABNORMAL MG/DL
LDLC SERPL CALC-MCNC: 87 MG/DL
LEUKOCYTE ESTERASE URINE: NEGATIVE
MCHC RBC-ENTMCNC: 29.1 PG
MCHC RBC-ENTMCNC: 31.5 GM/DL
MCV RBC AUTO: 92.4 FL
MEV IGG FLD QL IA: >300 AU/ML
MEV IGG+IGM SER-IMP: POSITIVE
MUV AB SER-ACNC: POSITIVE
MUV IGG SER QL IA: 141 AU/ML
N GONORRHOEA RRNA SPEC QL NAA+PROBE: NOT DETECTED
NITRITE URINE: NEGATIVE
NONHDLC SERPL-MCNC: 99 MG/DL
PH URINE: 6
PLATELET # BLD AUTO: 268 K/UL
POTASSIUM SERPL-SCNC: 3.9 MMOL/L
PROT SERPL-MCNC: 7.4 G/DL
PROTEIN URINE: NORMAL MG/DL
RBC # BLD: 4.23 M/UL
RBC # FLD: 14 %
RUBV IGG FLD-ACNC: 3.19 INDEX
RUBV IGG SER-IMP: POSITIVE
SODIUM SERPL-SCNC: 140 MMOL/L
SOURCE AMPLIFICATION: NORMAL
SPECIFIC GRAVITY URINE: 1.03
T PALLIDUM AB SER QL IA: NEGATIVE
TRIGL SERPL-MCNC: 61 MG/DL
TSH SERPL-ACNC: 0.53 UIU/ML
UROBILINOGEN URINE: 0.2 MG/DL
VIRAL LOAD INTERP: NORMAL
VIRAL LOAD LOG: NORMAL LG COP/ML
VZV AB TITR SER: POSITIVE
VZV IGG SER IF-ACNC: 3180 INDEX
WBC # FLD AUTO: 5.8 K/UL

## 2024-08-30 RX ORDER — ERGOCALCIFEROL 1.25 MG/1
1.25 MG CAPSULE, LIQUID FILLED ORAL
Qty: 12 | Refills: 2 | Status: ACTIVE | COMMUNITY
Start: 2024-08-30 | End: 1900-01-01

## 2024-08-30 NOTE — PHYSICAL EXAM
[General Appearance - Alert] : alert [General Appearance - In No Acute Distress] : in no acute distress [General Appearance - Well Nourished] : well nourished [General Appearance - Well-Appearing] : healthy appearing [Sclera] : the sclera and conjunctiva were normal [PERRL With Normal Accommodation] : pupils were equal in size, round, reactive to light [Extraocular Movements] : extraocular movements were intact [Outer Ear] : the ears and nose were normal in appearance [Hearing Threshold Finger Rub Not [place]] : hearing was normal [Examination Of The Oral Cavity] : the lips and gums were normal [Both Tympanic Membranes Were Examined] : both tympanic membranes were normal [Oropharynx] : the oropharynx was normal with no thrush [Neck Appearance] : the appearance of the neck was normal [Neck Cervical Mass (___cm)] : no neck mass was observed [Jugular Venous Distention Increased] : there was no jugular-venous distention [Thyroid Diffuse Enlargement] : the thyroid was not enlarged [Respiration, Rhythm And Depth] : normal respiratory rhythm and effort [Auscultation Breath Sounds / Voice Sounds] : lungs were clear to auscultation bilaterally [Exaggerated Use Of Accessory Muscles For Inspiration] : no accessory muscle use [Heart Rate And Rhythm] : heart rate was normal and rhythm regular [Heart Sounds] : normal S1 and S2 [Heart Sounds Gallop] : no gallops [Murmurs] : no murmurs [Heart Sounds Pericardial Friction Rub] : no pericardial rub [Edema] : there was no peripheral edema [Abdomen Soft] : soft [Bowel Sounds] : normal bowel sounds [Abdomen Tenderness] : non-tender [Costovertebral Angle Tenderness] : no CVA tenderness [No Palpable Adenopathy] : no palpable adenopathy [Cervical Lymph Nodes Enlarged Posterior Bilaterally] : posterior cervical [Cervical Lymph Nodes Enlarged Anterior Bilaterally] : anterior cervical [Supraclavicular Lymph Nodes Enlarged Bilaterally] : supraclavicular [Range of Motion to Joints] : range of motion to joints [Musculoskeletal - Swelling] : no joint swelling [Nail Clubbing] : no clubbing  or cyanosis of the fingernails [Motor Tone] : muscle strength and tone were normal [Skin Color & Pigmentation] : normal skin color and pigmentation [] : no rash [Skin Lesions] : no skin lesions [Cranial Nerves] : cranial nerves 2-12 were intact [Sensation] : the sensory exam was normal to light touch and pinprick [Motor Exam] : the motor exam was normal [No Focal Deficits] : no focal deficits [Affect] : the affect was normal [Oriented To Time, Place, And Person] : oriented to person, place, and time

## 2024-08-30 NOTE — HISTORY OF PRESENT ILLNESS
[FreeTextEntry1] : 45 yo female here for HIV annual consult  taking Biktarvy daily with no missed doses or SE  pt currently lives in [place] and comes annually for provider consults her medical insurance is based in [place] and she works from home  she goes to  or ER for viral or bacterial illnesses.   she is leaving in 2 days back to [place].    noted with occasionally anxious - takes Benadryl PRN with good result  hx of eczema triggered by heat  placing OTC creams with good result.   Screening : last pap 2015-NILM.  no mammo ever done.  last dental consult 3/2024.  Last ophthalmology consult 7/2024.   Sexual hx : last sexual encounter was 12 years ago.     From last consult 8/15/2023 :  43 F PMH HIV on Biktarvy, iron deficiency, vitamin D deficiency who presents for follow up.  Last seen Nov 2021 Biktarvy doses missed in last week: None, has been compliant. Acute complaints: None GYN follow up? Last seen in 2021. Will follow up. Refusing vaccinations     8/29/2024 Plan  HIV  counselled on obtaining PCP in [place] and to attend annual physical with PCP.   1. continue current treatment - refills given  2. do blood work  3. f/u one year as per pt request.   4. pt scheduled with GYN for tomorrow for cervical cancer screening  5. pt to investigate centers for mammo in [place] for breast cancer screening.

## 2024-08-30 NOTE — PHYSICAL EXAM
[General Appearance - Alert] : alert [General Appearance - In No Acute Distress] : in no acute distress [General Appearance - Well Nourished] : well nourished [General Appearance - Well-Appearing] : healthy appearing [Sclera] : the sclera and conjunctiva were normal [PERRL With Normal Accommodation] : pupils were equal in size, round, reactive to light [Extraocular Movements] : extraocular movements were intact [Outer Ear] : the ears and nose were normal in appearance [Hearing Threshold Finger Rub Not [place]] : hearing was normal [Examination Of The Oral Cavity] : the lips and gums were normal [Both Tympanic Membranes Were Examined] : both tympanic membranes were normal [Oropharynx] : the oropharynx was normal with no thrush [Neck Appearance] : the appearance of the neck was normal [Neck Cervical Mass (___cm)] : no neck mass was observed [Jugular Venous Distention Increased] : there was no jugular-venous distention [Thyroid Diffuse Enlargement] : the thyroid was not enlarged [Respiration, Rhythm And Depth] : normal respiratory rhythm and effort [Exaggerated Use Of Accessory Muscles For Inspiration] : no accessory muscle use [Auscultation Breath Sounds / Voice Sounds] : lungs were clear to auscultation bilaterally [Heart Rate And Rhythm] : heart rate was normal and rhythm regular [Heart Sounds] : normal S1 and S2 [Heart Sounds Gallop] : no gallops [Murmurs] : no murmurs [Heart Sounds Pericardial Friction Rub] : no pericardial rub [Edema] : there was no peripheral edema [Abdomen Soft] : soft [Bowel Sounds] : normal bowel sounds [Abdomen Tenderness] : non-tender [Costovertebral Angle Tenderness] : no CVA tenderness [No Palpable Adenopathy] : no palpable adenopathy [Cervical Lymph Nodes Enlarged Posterior Bilaterally] : posterior cervical [Cervical Lymph Nodes Enlarged Anterior Bilaterally] : anterior cervical [Supraclavicular Lymph Nodes Enlarged Bilaterally] : supraclavicular [Musculoskeletal - Swelling] : no joint swelling [Range of Motion to Joints] : range of motion to joints [Nail Clubbing] : no clubbing  or cyanosis of the fingernails [Motor Tone] : muscle strength and tone were normal [Skin Color & Pigmentation] : normal skin color and pigmentation [] : no rash [Skin Lesions] : no skin lesions [Cranial Nerves] : cranial nerves 2-12 were intact [Sensation] : the sensory exam was normal to light touch and pinprick [No Focal Deficits] : no focal deficits [Motor Exam] : the motor exam was normal [Oriented To Time, Place, And Person] : oriented to person, place, and time [Affect] : the affect was normal

## 2024-08-30 NOTE — HISTORY OF PRESENT ILLNESS
[FreeTextEntry1] : 43 yo female here for HIV annual consult  taking Biktarvy daily with no missed doses or SE  pt currently lives in [place] and comes annually for provider consults her medical insurance is based in [place] and she works from home  she goes to  or ER for viral or bacterial illnesses.   she is leaving in 2 days back to [place].    noted with occasionally anxious - takes Benadryl PRN with good result  hx of eczema triggered by heat  placing OTC creams with good result.   Screening : last pap 2015-NILM.  no mammo ever done.  last dental consult 3/2024.  Last ophthalmology consult 7/2024.   Sexual hx : last sexual encounter was 12 years ago.     From last consult 8/15/2023 :  43 F PMH HIV on Biktarvy, iron deficiency, vitamin D deficiency who presents for follow up.  Last seen Nov 2021 Biktarvy doses missed in last week: None, has been compliant. Acute complaints: None GYN follow up? Last seen in 2021. Will follow up. Refusing vaccinations     8/29/2024 Plan  HIV  counselled on obtaining PCP in [place] and to attend annual physical with PCP.   1. continue current treatment - refills given  2. do blood work  3. f/u one year as per pt request.   4. pt scheduled with GYN for tomorrow for cervical cancer screening  5. pt to investigate centers for mammo in [place] for breast cancer screening.

## 2024-08-30 NOTE — ASSESSMENT
[FreeTextEntry1] : HIV annual consult.  HIV stable   8/29/2024 Plan  HIV  counselled on obtaining PCP in [place] and to attend annual physical with PCP.   1. continue current treatment - refills given  2. do blood work  3. f/u one year as per pt request.   4. pt scheduled with GYN for tomorrow for cervical cancer screening  5. pt to investigate centers for mammo in [place] for breast cancer screening.      [Treatment Education] : treatment education [Treatment Adherence] : treatment adherence [Rx Dose / Side Effects] : Rx dose/side effects [Medical Care Issues] : medical care issues [HIV Education] : HIV Education

## 2024-09-03 LAB — HLX HLA B57-01 ANTIGEN FINAL REPORT: NORMAL

## 2024-09-05 LAB
M TB IFN-G BLD-IMP: NORMAL
QUANTIFERON TB PLUS MITOGEN MINUS NIL: 1.37 IU/ML
QUANTIFERON TB PLUS NIL: 0.04 IU/ML
QUANTIFERON TB PLUS TB1 MINUS NIL: 0 IU/ML
QUANTIFERON TB PLUS TB2 MINUS NIL: 0.01 IU/ML

## 2025-01-30 ENCOUNTER — NON-APPOINTMENT (OUTPATIENT)
Age: 45
End: 2025-01-30

## 2025-04-09 ENCOUNTER — RX RENEWAL (OUTPATIENT)
Age: 45
End: 2025-04-09

## 2025-08-28 ENCOUNTER — APPOINTMENT (OUTPATIENT)
Dept: INFECTIOUS DISEASE | Facility: CLINIC | Age: 45
End: 2025-08-28

## 2025-08-29 ENCOUNTER — NON-APPOINTMENT (OUTPATIENT)
Age: 45
End: 2025-08-29